# Patient Record
Sex: MALE | URBAN - METROPOLITAN AREA
[De-identification: names, ages, dates, MRNs, and addresses within clinical notes are randomized per-mention and may not be internally consistent; named-entity substitution may affect disease eponyms.]

---

## 2022-02-10 ENCOUNTER — IMPORTED ENCOUNTER (OUTPATIENT)
Dept: URBAN - METROPOLITAN AREA CLINIC 31 | Facility: CLINIC | Age: 32
End: 2022-02-10

## 2022-02-10 PROBLEM — T15.02XA: Noted: 2022-02-10

## 2022-02-10 PROCEDURE — 99213 OFFICE O/P EST LOW 20 MIN: CPT

## 2022-02-10 NOTE — PATIENT DISCUSSION
Corneal foreign body OS:  Risks benefits alternatives to foreign body removal was discussed with the patient. Embedded material was removed from the cornea with a combination of a FB spud 30 guage needleCyclo instilled.   Topical antibiotic drops instilled and prescribed (paper script given) 5 day checkNext visit - no charge

## 2022-03-10 ENCOUNTER — IMPORTED ENCOUNTER (OUTPATIENT)
Dept: URBAN - METROPOLITAN AREA CLINIC 31 | Facility: CLINIC | Age: 32
End: 2022-03-10

## 2022-03-10 PROBLEM — T15.02XA: Noted: 2022-03-10

## 2022-03-10 PROBLEM — B58.01: Noted: 2022-03-10

## 2022-03-10 PROCEDURE — 99213 OFFICE O/P EST LOW 20 MIN: CPT

## 2022-03-10 NOTE — PATIENT DISCUSSION
1. F/UP  Corneal foreign body OS:  Resolved- small scar paracentral not affecting vision. 2.  Toxoplasmosis OD: Monitor 3. Astigmatism OS: Rx given today ( no charge)4. Return for an appointment in 12 months for comprehensive exam. with Dr. Anaid Hernandez.

## 2022-04-02 ASSESSMENT — VISUAL ACUITY
OD_CC: 20/400
OS_CC: 20/25
OD_CC: 20/400
OS_SC: J1+
OD_SC: 20/400
OS_CC: 20/30-2